# Patient Record
Sex: FEMALE | Race: WHITE | NOT HISPANIC OR LATINO | Employment: FULL TIME | URBAN - METROPOLITAN AREA
[De-identification: names, ages, dates, MRNs, and addresses within clinical notes are randomized per-mention and may not be internally consistent; named-entity substitution may affect disease eponyms.]

---

## 2022-12-20 ENCOUNTER — TELEPHONE (OUTPATIENT)
Dept: RHEUMATOLOGY | Facility: CLINIC | Age: 42
End: 2022-12-20

## 2022-12-20 NOTE — TELEPHONE ENCOUNTER
Spke to pt regarding a sooner appt w/ rheumatology.   Pt stated she can't make appts in January because her family is moving.     Pt agreed to next earliest available Feb 1st at 10:30    Informed pt off apt details, location and provider.

## 2023-02-01 ENCOUNTER — OFFICE VISIT (OUTPATIENT)
Dept: RHEUMATOLOGY | Facility: CLINIC | Age: 43
End: 2023-02-01
Payer: COMMERCIAL

## 2023-02-01 ENCOUNTER — TELEPHONE (OUTPATIENT)
Dept: PHYSICAL MEDICINE AND REHAB | Facility: CLINIC | Age: 43
End: 2023-02-01
Payer: COMMERCIAL

## 2023-02-01 ENCOUNTER — HOSPITAL ENCOUNTER (OUTPATIENT)
Dept: RADIOLOGY | Facility: HOSPITAL | Age: 43
Discharge: HOME | End: 2023-02-01
Payer: COMMERCIAL

## 2023-02-01 VITALS
HEART RATE: 66 BPM | HEIGHT: 63 IN | SYSTOLIC BLOOD PRESSURE: 150 MMHG | DIASTOLIC BLOOD PRESSURE: 102 MMHG | WEIGHT: 167.13 LBS | BODY MASS INDEX: 29.61 KG/M2 | RESPIRATION RATE: 16 BRPM

## 2023-02-01 DIAGNOSIS — M54.41 CHRONIC RIGHT-SIDED LOW BACK PAIN WITH RIGHT-SIDED SCIATICA: ICD-10-CM

## 2023-02-01 DIAGNOSIS — M79.2 RADICULAR PAIN IN RIGHT ARM: ICD-10-CM

## 2023-02-01 DIAGNOSIS — M25.50 POLYARTHRALGIA: Primary | ICD-10-CM

## 2023-02-01 DIAGNOSIS — M25.50 POLYARTHRALGIA: ICD-10-CM

## 2023-02-01 DIAGNOSIS — G89.29 CHRONIC RIGHT-SIDED LOW BACK PAIN WITH RIGHT-SIDED SCIATICA: ICD-10-CM

## 2023-02-01 DIAGNOSIS — M20.11 HALLUX VALGUS OF RIGHT FOOT: ICD-10-CM

## 2023-02-01 PROCEDURE — 72052 XR CERVICAL SPINE 5 VIEW WITH FLEX AND EXT: ICD-10-PCS | Mod: 26,,, | Performed by: RADIOLOGY

## 2023-02-01 PROCEDURE — 3008F PR BODY MASS INDEX (BMI) DOCUMENTED: ICD-10-PCS | Mod: CPTII,S$GLB,,

## 2023-02-01 PROCEDURE — 72052 X-RAY EXAM NECK SPINE 6/>VWS: CPT | Mod: TC

## 2023-02-01 PROCEDURE — 1159F PR MEDICATION LIST DOCUMENTED IN MEDICAL RECORD: ICD-10-PCS | Mod: CPTII,S$GLB,,

## 2023-02-01 PROCEDURE — 99205 PR OFFICE/OUTPT VISIT, NEW, LEVL V, 60-74 MIN: ICD-10-PCS | Mod: S$GLB,,,

## 2023-02-01 PROCEDURE — 3077F PR MOST RECENT SYSTOLIC BLOOD PRESSURE >= 140 MM HG: ICD-10-PCS | Mod: CPTII,S$GLB,,

## 2023-02-01 PROCEDURE — 1160F RVW MEDS BY RX/DR IN RCRD: CPT | Mod: CPTII,S$GLB,,

## 2023-02-01 PROCEDURE — 99999 PR PBB SHADOW E&M-EST. PATIENT-LVL V: ICD-10-PCS | Mod: PBBFAC,,,

## 2023-02-01 PROCEDURE — 3080F PR MOST RECENT DIASTOLIC BLOOD PRESSURE >= 90 MM HG: ICD-10-PCS | Mod: CPTII,S$GLB,,

## 2023-02-01 PROCEDURE — 72110 X-RAY EXAM L-2 SPINE 4/>VWS: CPT | Mod: TC

## 2023-02-01 PROCEDURE — 3008F BODY MASS INDEX DOCD: CPT | Mod: CPTII,S$GLB,,

## 2023-02-01 PROCEDURE — 72110 X-RAY EXAM L-2 SPINE 4/>VWS: CPT | Mod: 26,,, | Performed by: RADIOLOGY

## 2023-02-01 PROCEDURE — 99205 OFFICE O/P NEW HI 60 MIN: CPT | Mod: S$GLB,,,

## 2023-02-01 PROCEDURE — 3080F DIAST BP >= 90 MM HG: CPT | Mod: CPTII,S$GLB,,

## 2023-02-01 PROCEDURE — 3077F SYST BP >= 140 MM HG: CPT | Mod: CPTII,S$GLB,,

## 2023-02-01 PROCEDURE — 1160F PR REVIEW ALL MEDS BY PRESCRIBER/CLIN PHARMACIST DOCUMENTED: ICD-10-PCS | Mod: CPTII,S$GLB,,

## 2023-02-01 PROCEDURE — 72110 XR LUMBAR SPINE COMPLETE 5 VIEW: ICD-10-PCS | Mod: 26,,, | Performed by: RADIOLOGY

## 2023-02-01 PROCEDURE — 1159F MED LIST DOCD IN RCRD: CPT | Mod: CPTII,S$GLB,,

## 2023-02-01 PROCEDURE — 72052 X-RAY EXAM NECK SPINE 6/>VWS: CPT | Mod: 26,,, | Performed by: RADIOLOGY

## 2023-02-01 PROCEDURE — 99999 PR PBB SHADOW E&M-EST. PATIENT-LVL V: CPT | Mod: PBBFAC,,,

## 2023-02-01 RX ORDER — MELOXICAM 15 MG/1
15 TABLET ORAL
COMMUNITY
Start: 2023-01-27 | End: 2023-02-14

## 2023-02-01 RX ORDER — ACYCLOVIR 200 MG/1
200 CAPSULE ORAL
COMMUNITY
Start: 2023-01-04

## 2023-02-01 RX ORDER — VALACYCLOVIR HYDROCHLORIDE 1 G/1
1000 TABLET, FILM COATED ORAL 3 TIMES DAILY
COMMUNITY
Start: 2023-01-14 | End: 2023-02-01 | Stop reason: ALTCHOICE

## 2023-02-01 RX ORDER — NORETHINDRONE ACETATE/ETHINYL ESTRADIOL 1.5-0.03MG
1 KIT ORAL
COMMUNITY
Start: 2023-01-14

## 2023-02-01 RX ORDER — IBUPROFEN 600 MG/1
600 TABLET ORAL EVERY 6 HOURS PRN
COMMUNITY
Start: 2022-11-10

## 2023-02-01 NOTE — PROGRESS NOTES
RHEUMATOLOGY OUTPATIENT CLINIC NOTE    02/01/2023    Subjective:       Patient ID: Linda Larson is a 42 y.o. female.    Chief Complaint: Arthritis      HPI       Linda Larson is a 42 y.o. pleasant female here for rheumatology initial evaluation. Referred for polyarthralgias.     She reports stiffness in her hands first thing in the morning R>L lasting 2 hours. The stiffness has been going on for years.     Reports history of scoliosis in herself and multiple siblings. She did not meet surgical criteria as a child but wore a back brace for 5.5 years starting at age 11. Has previously gone to the [facility] and received lumbar epidural injections.      Reports difficulty getting comfortable while sleeping due to pain. Pain in her right shoulder which radiates down her arm. Also pain in her right shoulder blade. Feels as though the pain in her R arm is so severe at times that the stress of it causes her to have herpes outbreaks. After delievery of one of her children, she had significant body shakes. She was told that this perhaps caused some cervical nerve damage which could contribute to the pain she experiences. She does report occasional weakness in her hands, arms, and legs which varies and occurs at random.     Also with right bunion. Has not seen podiatry. Uses a spacer and tries to wear supportive footwear that is not tight fitting in the toe area. Occasionally with significant pain in the right first MTP. Denies gout flare symptoms.     Reports prior bakers cysts in right knee. Prior knee injections with some relief.       ROS:  Muscular stiffness, am stiffness in hands >2 hours  Some swelling in ankles and feet.Also reports full-finger swelling in multiple fingers 1-2 x/month. Occasional tenderness at various IP joints shiva hands.  Denies synovitis, no erythema, no increased warmth to other joints  Occasionally her fingers will turn purple in the cold.  Denies rash or skin thickening.  Recent shingles infection 3 weeks ago on right forearm.   Denies difficulty swallowing.  Rheumatologic review of systems negative otherwise.       Fam hx: Grandmother with fibromyalgia. Nephew lupus (sister son)   Tobacco use: No. Stopped Feb 2022. Smoked on and off since 17.   Alcohol use: Occasion once monthly  Occupation: Medical billing. Tries to go on walks in morning and afternoon break   Prior was on treadmill working out three days a week. Felt better then.   Clots: No   Miscarriage: one while 3 months along. Partial molar pregnancy    Prior therapies:  Ibuprofen - short term relief. Takes 800 mg at a time. Lasts for 2 hours. Daily. Denies prior PUD  Tylenol arthritis - no improvement.   Turmeric - didn't see significant difference. Took for 2 weeks.       Serologies:  ADOLPH negative 10/19/2022  RF < 14 10/19/2022  Sed rate normal     Physical Exam:  No obvious synovitis, no erythema, no increased warmth to any joints shiva UE/LE.    No dactylitis, no enthesitis.   Good lumbar ROM with forward leaning from standing position.   No skin thickening. No rash. No active raynauds.   No obvious lymphadenopathy.           Past Medical History:   Diagnosis Date    Arthritis     Scoliosis      History reviewed. No pertinent surgical history.  Family History   Problem Relation Age of Onset    Arthritis Mother     Cancer Father     Heart disease Father     Diabetes Mellitus Father     Hypertension Father      Social History     Socioeconomic History    Marital status:    Tobacco Use    Smoking status: Never     Passive exposure: Never    Smokeless tobacco: Never   Substance and Sexual Activity    Alcohol use: Yes    Drug use: Never    Sexual activity: Yes     Partners: Male     Review of patient's allergies indicates:   Allergen Reactions    Latex Other (See Comments)     irritation    Sulfa (sulfonamide antibiotics)     Adhesive tape-silicones Rash           Objective:       BP (!) 150/102 (BP Location: Left arm,  "Patient Position: Sitting, BP Method: Medium (Automatic))   Pulse 66   Resp 16   Ht 5' 2.5" (1.588 m)   Wt 75.8 kg (167 lb 1.7 oz)   BMI 30.08 kg/m²       Immunization History   Administered Date(s) Administered    COVID-19, MRNA, LN-S, PF (Pfizer) (Purple Cap) 10/25/2021, 11/15/2021            Recent Results (from the past 672 hour(s))   CBC Auto Differential    Collection Time: 02/01/23 11:35 AM   Result Value Ref Range    WBC 7.20 3.90 - 12.70 K/uL    RBC 4.57 4.00 - 5.40 M/uL    Hemoglobin 14.3 12.0 - 16.0 g/dL    Hematocrit 42.2 37.0 - 48.5 %    MCV 92 82 - 98 fL    MCH 31.3 (H) 27.0 - 31.0 pg    MCHC 33.9 32.0 - 36.0 g/dL    RDW 13.7 11.5 - 14.5 %    Platelets 284 150 - 450 K/uL    MPV 10.5 9.2 - 12.9 fL    Immature Granulocytes 0.1 0.0 - 0.5 %    Gran # (ANC) 4.6 1.8 - 7.7 K/uL    Immature Grans (Abs) 0.01 0.00 - 0.04 K/uL    Lymph # 2.0 1.0 - 4.8 K/uL    Mono # 0.5 0.3 - 1.0 K/uL    Eos # 0.1 0.0 - 0.5 K/uL    Baso # 0.05 0.00 - 0.20 K/uL    nRBC 0 0 /100 WBC    Gran % 63.6 38.0 - 73.0 %    Lymph % 27.2 18.0 - 48.0 %    Mono % 7.4 4.0 - 15.0 %    Eosinophil % 1.0 0.0 - 8.0 %    Basophil % 0.7 0.0 - 1.9 %    Differential Method Automated    Comprehensive Metabolic Panel    Collection Time: 02/01/23 11:35 AM   Result Value Ref Range    Sodium 137 136 - 145 mmol/L    Potassium 3.8 3.5 - 5.1 mmol/L    Chloride 105 95 - 110 mmol/L    CO2 22 (L) 23 - 29 mmol/L    Glucose 85 70 - 110 mg/dL    BUN 12 6 - 20 mg/dL    Creatinine 0.6 0.5 - 1.4 mg/dL    Calcium 9.6 8.7 - 10.5 mg/dL    Total Protein 7.9 6.0 - 8.4 g/dL    Albumin 4.3 3.5 - 5.2 g/dL    Total Bilirubin 0.5 0.1 - 1.0 mg/dL    Alkaline Phosphatase 66 55 - 135 U/L    AST 21 10 - 40 U/L    ALT 23 10 - 44 U/L    Anion Gap 10 8 - 16 mmol/L    eGFR >60 >60 mL/min/1.73 m^2   Sedimentation rate    Collection Time: 02/01/23 11:35 AM   Result Value Ref Range    Sed Rate 1 0 - 20 mm/Hr   C-Reactive Protein    Collection Time: 02/01/23 11:35 AM   Result Value Ref " Range    CRP 4.0 0.0 - 8.2 mg/L   Uric Acid    Collection Time: 02/01/23 11:35 AM   Result Value Ref Range    Uric Acid 4.1 2.4 - 5.7 mg/dL        No results found for: TBGOLDPLUS   No results found for: HAV, HEPAIGM, HEPBIGM, HEPBCAB, HBEAG, HEPCAB     Assessment:       1. Polyarthralgia    2. Radicular pain in right arm    3. Chronic right-sided low back pain with right-sided sciatica    4. Hallux valgus of right foot          Impression:   Polyarthralgia   Morning stiffness in hands > 2 hours, claw-like stiffness per patient. Reports prior dactylitis. Also possible raynauds in fingers as patient reports prior purple discoloration of fingers with cold exposure. ADOLPH, RF, sed rate, TSH all within normal limits. No synovitis on physical exam. Given prolonged morning stiffness will assess for SSc    Radicular right arm pain  Pain in her right arm which radiates down the arm. Chronic for many years. Good range of motion of that shoulder and full strength bilateral UE.     Chronic low back pain with right sided sciatica  History of scoliosis per patient, wore a back brace for many years as an adolescent.  Chronic low back pain. Prior relief with lumbar DAKOTA.   Sed rate normal. No fam history of ankylosing spondylitis. She does have SI pain. Reported history of dactylitis.  Could assess shiva SI joints to evaluate for AxSpa. However, her morning stiffness is more in the hands and not in the back, no nocturnal awakenings from back pain. No prior uveitis, no IBD.     Plan:          Orders Placed This Encounter   Procedures    X-Ray Cervical Spine 5 View With Flex And Ext    X-Ray Lumbar Spine 5 View    CBC Auto Differential    Comprehensive Metabolic Panel    Sedimentation rate    C-Reactive Protein    Cyclic Citrullinated Peptide Antibody, IgG    Uric Acid    RNA polymerase III Ab, IgG    Anti-Scleroderma Antibody    Anti-Centromere Antibody    Ambulatory referral/consult to Podiatry    EMG W/ ULTRASOUND AND NERVE CONDUCTION  TEST 2 Extremities        Polyarthralgia:  Above labs today   Consider trial turmeric   Will evaluate for autoimmune cause of arthralgia  Raudel hand xray to assess for erosions     R arm pain:  Encourage daily stretches and strengthening  Consider topical voltaren   Consider PT  EMG R arm  Cervical spine xray    Chronic low back pain:  Stretches, exercise  Lumbar xray  Consider referral to back and spine for evaluation, possible injections in future if pain persists.   EMG R leg  Pending labs today could consider SI xray looking for sacroiliitis.     Hallux valgus right 1st mtp:  Continue conservative therapy  Toe stretches  Shoewear with good toe space  Referral podiatry    Labs and xray today   Follow up 3-4 months     Advised patient to let me know if any new or worsening symptoms prior to next appointment.     More Hayes PA-C  [facility]  Rheumatology       60 minutes of total time spent on the encounter, which includes face to face time and non-face to face time preparing to see the patient (eg, review of tests), Obtaining and/or reviewing separately obtained history, Documenting clinical information in the electronic or other health record, Independently interpreting results (not separately reported) and communicating results to the patient/family/caregiver, or Care coordination (not separately reported).       Disclaimer: This note was prepared using voice recognition system and is likely to have sound alike errors and is not proof read.  Please call me with any questions

## 2023-02-02 ENCOUNTER — TELEPHONE (OUTPATIENT)
Dept: RHEUMATOLOGY | Facility: CLINIC | Age: 43
End: 2023-02-02
Payer: COMMERCIAL

## 2023-02-02 NOTE — TELEPHONE ENCOUNTER
----- Message from More Hayes PA-C sent at 2/1/2023  4:00 PM CST -----  Plz have pt do hand and SI xrays earliest convenience

## 2023-02-08 ENCOUNTER — TELEPHONE (OUTPATIENT)
Dept: PHYSICAL MEDICINE AND REHAB | Facility: CLINIC | Age: 43
End: 2023-02-08
Payer: COMMERCIAL

## 2023-02-14 ENCOUNTER — OFFICE VISIT (OUTPATIENT)
Dept: PODIATRY | Facility: CLINIC | Age: 43
End: 2023-02-14
Payer: COMMERCIAL

## 2023-02-14 ENCOUNTER — HOSPITAL ENCOUNTER (OUTPATIENT)
Dept: RADIOLOGY | Facility: HOSPITAL | Age: 43
Discharge: HOME | End: 2023-02-14
Payer: COMMERCIAL

## 2023-02-14 ENCOUNTER — HOSPITAL ENCOUNTER (OUTPATIENT)
Dept: RADIOLOGY | Facility: HOSPITAL | Age: 43
Discharge: HOME | End: 2023-02-14
Attending: PODIATRIST
Payer: COMMERCIAL

## 2023-02-14 VITALS — BODY MASS INDEX: 30.76 KG/M2 | HEIGHT: 62 IN | WEIGHT: 167.13 LBS

## 2023-02-14 DIAGNOSIS — M54.41 CHRONIC RIGHT-SIDED LOW BACK PAIN WITH RIGHT-SIDED SCIATICA: ICD-10-CM

## 2023-02-14 DIAGNOSIS — M25.50 POLYARTHRALGIA: ICD-10-CM

## 2023-02-14 DIAGNOSIS — M79.671 PAIN IN RIGHT FOOT: ICD-10-CM

## 2023-02-14 DIAGNOSIS — E55.9 VITAMIN D INSUFFICIENCY: ICD-10-CM

## 2023-02-14 DIAGNOSIS — G89.29 CHRONIC RIGHT-SIDED LOW BACK PAIN WITH RIGHT-SIDED SCIATICA: ICD-10-CM

## 2023-02-14 DIAGNOSIS — M84.374A STRESS FRACTURE OF METATARSAL BONE, RIGHT, INITIAL ENCOUNTER: Primary | ICD-10-CM

## 2023-02-14 PROCEDURE — 73130 X-RAY EXAM OF HAND: CPT | Mod: 26,LT,, | Performed by: RADIOLOGY

## 2023-02-14 PROCEDURE — 73130 X-RAY EXAM OF HAND: CPT | Mod: TC,50

## 2023-02-14 PROCEDURE — 3008F PR BODY MASS INDEX (BMI) DOCUMENTED: ICD-10-PCS | Mod: CPTII,S$GLB,, | Performed by: PODIATRIST

## 2023-02-14 PROCEDURE — 1159F PR MEDICATION LIST DOCUMENTED IN MEDICAL RECORD: ICD-10-PCS | Mod: CPTII,S$GLB,, | Performed by: PODIATRIST

## 2023-02-14 PROCEDURE — 99204 PR OFFICE/OUTPT VISIT, NEW, LEVL IV, 45-59 MIN: ICD-10-PCS | Mod: S$GLB,,, | Performed by: PODIATRIST

## 2023-02-14 PROCEDURE — 73630 XR FOOT COMPLETE 3 VIEW RIGHT: ICD-10-PCS | Mod: 26,RT,, | Performed by: RADIOLOGY

## 2023-02-14 PROCEDURE — 73630 X-RAY EXAM OF FOOT: CPT | Mod: TC,RT

## 2023-02-14 PROCEDURE — 99999 PR PBB SHADOW E&M-EST. PATIENT-LVL III: CPT | Mod: PBBFAC,,, | Performed by: PODIATRIST

## 2023-02-14 PROCEDURE — 73130 X-RAY EXAM OF HAND: CPT | Mod: 26,RT,, | Performed by: RADIOLOGY

## 2023-02-14 PROCEDURE — 73630 X-RAY EXAM OF FOOT: CPT | Mod: 26,RT,, | Performed by: RADIOLOGY

## 2023-02-14 PROCEDURE — 72202 X-RAY EXAM SI JOINTS 3/> VWS: CPT | Mod: TC

## 2023-02-14 PROCEDURE — 72202: ICD-10-PCS | Mod: 26,,, | Performed by: RADIOLOGY

## 2023-02-14 PROCEDURE — 1159F MED LIST DOCD IN RCRD: CPT | Mod: CPTII,S$GLB,, | Performed by: PODIATRIST

## 2023-02-14 PROCEDURE — 73130 XR SACROILIAC JOINTS COMPLETE: ICD-10-PCS | Mod: 26,LT,, | Performed by: RADIOLOGY

## 2023-02-14 PROCEDURE — 99999 PR PBB SHADOW E&M-EST. PATIENT-LVL III: ICD-10-PCS | Mod: PBBFAC,,, | Performed by: PODIATRIST

## 2023-02-14 PROCEDURE — 99204 OFFICE O/P NEW MOD 45 MIN: CPT | Mod: S$GLB,,, | Performed by: PODIATRIST

## 2023-02-14 PROCEDURE — 1160F PR REVIEW ALL MEDS BY PRESCRIBER/CLIN PHARMACIST DOCUMENTED: ICD-10-PCS | Mod: CPTII,S$GLB,, | Performed by: PODIATRIST

## 2023-02-14 PROCEDURE — 72202 X-RAY EXAM SI JOINTS 3/> VWS: CPT | Mod: 26,,, | Performed by: RADIOLOGY

## 2023-02-14 PROCEDURE — 1160F RVW MEDS BY RX/DR IN RCRD: CPT | Mod: CPTII,S$GLB,, | Performed by: PODIATRIST

## 2023-02-14 PROCEDURE — 3008F BODY MASS INDEX DOCD: CPT | Mod: CPTII,S$GLB,, | Performed by: PODIATRIST

## 2023-02-14 RX ORDER — ERGOCALCIFEROL 1.25 MG/1
50000 CAPSULE ORAL
Qty: 4 CAPSULE | Refills: 3 | Status: SHIPPED | OUTPATIENT
Start: 2023-02-14 | End: 2023-03-08

## 2023-02-14 RX ORDER — DICLOFENAC SODIUM 75 MG/1
75 TABLET, DELAYED RELEASE ORAL 2 TIMES DAILY
Qty: 60 TABLET | Refills: 1 | Status: SHIPPED | OUTPATIENT
Start: 2023-02-14 | End: 2023-03-16

## 2023-02-14 NOTE — PROGRESS NOTES
Subjective:       Patient ID: Linda Larson is a 42 y.o. female.    Chief Complaint: Foot Problem (Coming in for a bunion on right foot, rates pain 10/10, nondiabetic, wearing socks and shoes, No PCP. )      HPI: Linda Larson presents to the clinic today for evaluation concerning stated moderate pains to the right foot/ankle at the midfoot. Patient states pains are approx. 8/10. Pains are described as achy. Pains have been present for duration of weeks. Patient states the pains are exacerbated with walking and standing and prolonged activities. States prior medical evaluation by a MD/DO/DPM/NP. Recent rheumatoid work up was WNL. States NSAIDs. Trauma is not stated. Patient's Primary Care Provider is Primary Doctor No.     Review of patient's allergies indicates:   Allergen Reactions    Latex Other (See Comments)     irritation    Sulfa (sulfonamide antibiotics)     Adhesive tape-silicones Rash       Past Medical History:   Diagnosis Date    Arthritis     Scoliosis        Family History   Problem Relation Age of Onset    Arthritis Mother     Cancer Father     Heart disease Father     Diabetes Mellitus Father     Hypertension Father        Social History     Socioeconomic History    Marital status:    Tobacco Use    Smoking status: Never     Passive exposure: Never    Smokeless tobacco: Never   Substance and Sexual Activity    Alcohol use: Yes    Drug use: Never    Sexual activity: Yes     Partners: Male       History reviewed. No pertinent surgical history.    Review of Systems   Constitutional:  Negative for chills, fatigue and fever.   HENT:  Negative for hearing loss.    Eyes:  Negative for photophobia and visual disturbance.   Respiratory:  Negative for cough, chest tightness, shortness of breath and wheezing.    Cardiovascular:  Negative for chest pain and palpitations.   Gastrointestinal:  Negative for constipation, diarrhea, nausea and vomiting.   Endocrine: Negative for cold intolerance and heat  "intolerance.   Genitourinary:  Negative for flank pain.   Musculoskeletal:  Positive for gait problem. Negative for neck pain and neck stiffness.   Neurological:  Negative for light-headedness and headaches.   Psychiatric/Behavioral:  Negative for sleep disturbance.        Objective:   Ht 5' 2" (1.575 m)   Wt 75.8 kg (167 lb 1.7 oz)   BMI 30.56 kg/m²     X-Ray Foot Complete Right  Narrative: EXAMINATION:  XR FOOT COMPLETE 3 VIEW RIGHT    CLINICAL HISTORY:  . Pain in right foot    TECHNIQUE:  AP, lateral, and oblique views of the foot were performed.    COMPARISON:  None    FINDINGS:  There is no evidence to suggest acute fracture or dislocation.  The joint spaces appear to be well maintained.  There is a well corticated subchondral cyst seen within the medial aspect of the 1st metatarsal head.  Impression: As above    Electronically signed by: Bonilla Stevenson DO  Date:    02/14/2023  Time:    16:09  X-Ray Sacroiliac Joints Complete  Narrative: EXAMINATION:  XR SACROILIAC JOINTS COMPLETE    CLINICAL HISTORY:  Lumbago with sciatica, right side    TECHNIQUE:  Three views    COMPARISON:  None    FINDINGS:  No ankylosis.  No erosive changes or sclerosis identified.  Impression: As above    Electronically signed by: Bonilla Stevenson DO  Date:    02/14/2023  Time:    15:40  X-Ray Hand Complete Bilateral  Narrative: EXAMINATION:  XR HAND COMPLETE 3 VIEWS BILATERAL    CLINICAL HISTORY:  . Pain in unspecified joint    TECHNIQUE:  PA, lateral, and oblique views of both hands were performed.    COMPARISON:  None    FINDINGS:  The joint spaces of both hands appear to be relatively well maintained.  No acute fracture or dislocation.  No erosive changes are identified.  Impression: As above    Electronically signed by: Bonilla Stevenson DO  Date:    02/14/2023  Time:    15:39      Physical Exam  LOWER EXTREMITY PHYSICAL EXAMINATION    VASCULAR: The right DP pulse is 2/4 and the left DP is 24. The right PT pulse is 2/4 and the left PT " pulse is 2/4. Proximal to distal, warm to warm.    DERMATOLOGY: Skin is supple, dry and intact. No erythema is noted.     NEUROLOGY: Negative Tinel's Sign and negative Valleix Sign. No neurological sensations with compression of the area of Simmons's Nerve in the area of the Abductor Hallucis muscle belly. Upon palpation of the interspaces, there are no neurological sensations stated that radiate proximal or distal. Upon compression of the metatarsal heads from medial to lateral, no neurological sensations or symptoms are stated.     ORTHOPEDIC: Pains to palpation, 3rd metatarsal bone, midshaft. Webspace exam is WNL. MTPJ and TMTJ examination is WNL. Stress adduction and abduction is WNL. Gastroc equinus is noted. Minimal edema is noted.     Assessment:     1. Stress fracture of metatarsal bone, right, initial encounter    2. Pain in right foot    3. Vitamin D insufficiency          Plan:     Stress fracture of metatarsal bone, right, initial encounter  -     Ambulatory referral/consult to Podiatry    Pain in right foot  -     X-Ray Foot Complete Right; Future; Expected date: 02/14/2023  -     diclofenac (VOLTAREN) 75 MG EC tablet; Take 1 tablet (75 mg total) by mouth 2 (two) times daily.  Dispense: 60 tablet; Refill: 1    Vitamin D insufficiency  -     ergocalciferol (ERGOCALCIFEROL) 50,000 unit Cap; Take 1 capsule (50,000 Units total) by mouth every 7 days. for 4 doses  Dispense: 4 capsule; Refill: 3    Thorough discussion is had with the patient today, concerning the diagnosis, its etiology, and the treatment algorithm at present.     XRAYS are reviewed in detail with the patient. All questions and concerns regarding findings and its/their implications are outlined and discussed.    Start Vit. D. Qweekly.    Likely Dx of stress reaction.    Would prefer CAM Walker for 14-21 days with PO NSAIDs.     For now, PO NSAIDs only.    F/U if no improvement.           Future Appointments   Date Time Provider Department  [place]   2/24/2023  8:00 AM Jessie Yeung MD [facility] [place]   7/5/2023 12:30 PM [facility]

## 2023-02-24 ENCOUNTER — OFFICE VISIT (OUTPATIENT)
Dept: PHYSICAL MEDICINE AND REHAB | Facility: CLINIC | Age: 43
End: 2023-02-24
Payer: COMMERCIAL

## 2023-02-24 VITALS
RESPIRATION RATE: 13 BRPM | SYSTOLIC BLOOD PRESSURE: 127 MMHG | HEIGHT: 62 IN | DIASTOLIC BLOOD PRESSURE: 89 MMHG | WEIGHT: 167 LBS | HEART RATE: 82 BPM | BODY MASS INDEX: 30.73 KG/M2

## 2023-02-24 DIAGNOSIS — G56.03 BILATERAL CARPAL TUNNEL SYNDROME: ICD-10-CM

## 2023-02-24 DIAGNOSIS — M54.16 LUMBAR RADICULOPATHY: ICD-10-CM

## 2023-02-24 PROBLEM — N92.4 PREMENOPAUSAL MENORRHAGIA: Status: ACTIVE | Noted: 2022-10-31

## 2023-02-24 PROCEDURE — 3074F PR MOST RECENT SYSTOLIC BLOOD PRESSURE < 130 MM HG: ICD-10-PCS | Mod: CPTII,S$GLB,, | Performed by: PHYSICAL MEDICINE & REHABILITATION

## 2023-02-24 PROCEDURE — 99999 PR PBB SHADOW E&M-EST. PATIENT-LVL IV: CPT | Mod: PBBFAC,,, | Performed by: PHYSICAL MEDICINE & REHABILITATION

## 2023-02-24 PROCEDURE — 1160F RVW MEDS BY RX/DR IN RCRD: CPT | Mod: CPTII,S$GLB,, | Performed by: PHYSICAL MEDICINE & REHABILITATION

## 2023-02-24 PROCEDURE — 95886 MUSC TEST DONE W/N TEST COMP: CPT | Mod: S$GLB,,, | Performed by: PHYSICAL MEDICINE & REHABILITATION

## 2023-02-24 PROCEDURE — 1159F PR MEDICATION LIST DOCUMENTED IN MEDICAL RECORD: ICD-10-PCS | Mod: CPTII,S$GLB,, | Performed by: PHYSICAL MEDICINE & REHABILITATION

## 2023-02-24 PROCEDURE — 95913 PR NERVE CONDUCTION STUDY; 13 OR MORE STUDIES: ICD-10-PCS | Mod: S$GLB,,, | Performed by: PHYSICAL MEDICINE & REHABILITATION

## 2023-02-24 PROCEDURE — 3074F SYST BP LT 130 MM HG: CPT | Mod: CPTII,S$GLB,, | Performed by: PHYSICAL MEDICINE & REHABILITATION

## 2023-02-24 PROCEDURE — 1160F PR REVIEW ALL MEDS BY PRESCRIBER/CLIN PHARMACIST DOCUMENTED: ICD-10-PCS | Mod: CPTII,S$GLB,, | Performed by: PHYSICAL MEDICINE & REHABILITATION

## 2023-02-24 PROCEDURE — 3008F BODY MASS INDEX DOCD: CPT | Mod: CPTII,S$GLB,, | Performed by: PHYSICAL MEDICINE & REHABILITATION

## 2023-02-24 PROCEDURE — 99499 NO LOS: ICD-10-PCS | Mod: S$GLB,,, | Performed by: PHYSICAL MEDICINE & REHABILITATION

## 2023-02-24 PROCEDURE — 3079F DIAST BP 80-89 MM HG: CPT | Mod: CPTII,S$GLB,, | Performed by: PHYSICAL MEDICINE & REHABILITATION

## 2023-02-24 PROCEDURE — 95886 PR EMG COMPLETE, W/ NERVE CONDUCTION STUDIES, 5+ MUSCLES: ICD-10-PCS | Mod: S$GLB,,, | Performed by: PHYSICAL MEDICINE & REHABILITATION

## 2023-02-24 PROCEDURE — 99999 PR PBB SHADOW E&M-EST. PATIENT-LVL IV: ICD-10-PCS | Mod: PBBFAC,,, | Performed by: PHYSICAL MEDICINE & REHABILITATION

## 2023-02-24 PROCEDURE — 95913 NRV CNDJ TEST 13/> STUDIES: CPT | Mod: S$GLB,,, | Performed by: PHYSICAL MEDICINE & REHABILITATION

## 2023-02-24 PROCEDURE — 3079F PR MOST RECENT DIASTOLIC BLOOD PRESSURE 80-89 MM HG: ICD-10-PCS | Mod: CPTII,S$GLB,, | Performed by: PHYSICAL MEDICINE & REHABILITATION

## 2023-02-24 PROCEDURE — 1159F MED LIST DOCD IN RCRD: CPT | Mod: CPTII,S$GLB,, | Performed by: PHYSICAL MEDICINE & REHABILITATION

## 2023-02-24 PROCEDURE — 3008F PR BODY MASS INDEX (BMI) DOCUMENTED: ICD-10-PCS | Mod: CPTII,S$GLB,, | Performed by: PHYSICAL MEDICINE & REHABILITATION

## 2023-02-24 PROCEDURE — 99499 UNLISTED E&M SERVICE: CPT | Mod: S$GLB,,, | Performed by: PHYSICAL MEDICINE & REHABILITATION

## 2023-02-24 RX ORDER — MELOXICAM 15 MG/1
15 TABLET ORAL
COMMUNITY
Start: 2022-12-20

## 2023-02-24 NOTE — PROGRESS NOTES
[facility]   [address]  [place] [address]  Phone: [phone]        Full Name: Linda Larson YOB: 1980  Patient ID: [id number]      Visit Date: 2/24/2023 08:20  Age: 42 Years  Examining Physician: Dr Yeung  Referring Physician: TAMARA Jones  Conclusion: upper and lower ext        Chief Complaint   Patient presents with    Numbness     Right arm and leg       HPI: This is a 42 y.o.  female being seen in clinic today for evaluation of chronic right arm and leg numbness/tingling that worsens after increased activity.  She can have achy pain and numbness at rest as well. Medications provide some relief.     History obtained from patient    Past family, medical, social, and surgical history reviewed in chart    Review of Systems:     General- denies lethargy, weight change, fever, chills  Head/neck- denies swallowing difficulties  ENT- denies hearing changes  Cardiovascular-denies chest pain  Pulmonary- denies shortness of breath  GI- denies constipation or bowel incontinence  - denies bladder incontinence  Skin- denies wounds or rashes  Musculoskeletal- denies weakness, + pain  Neurologic- + numbness and tingling  Psychiatric- denies depressive or psychotic features, denies anxiety  Lymphatic-denies swelling  Endocrine- denies hypoglycemic symptoms/DM history  All other pertinent systems negative     Physical Examination:  General: Well developed, well nourished female, NAD  HEENT:NCAT EOMI bilaterally   Pulmonary:Normal respirations    Spinal Examination: CERVICAL  Active ROM is within normal limits.  Inspection: No deformity of spinal alignment.     Spinal Examination: LUMBAR or THORACIC  Active ROM is within normal limits.  Inspection: No deformity of spinal alignment.    Slr neg bilaterally    Musculoskeletal Tests:  Phalen: neg  Elbow compression (ulnar): +on right  Tinels at wrist: +on right    Bilateral Upper and Lower Extremities:  Pulses are 2+ at radial  bilaterally.  Shoulder/Elbow/Wrist/Hand ROM wnl  Hip/Knee/Ankle ROM wnl  Bilateral Extremities show normal capillary refill.  No signs of cyanosis, rubor, edema, skin changes, or dysvascular changes of appendages.  Nails appear intact.    Neurological Exam:  Cranial Nerves:  II-XII grossly intact    Manual Muscle Testing: (Motor 5=normal)  5/5 strength bilateral upper/lower extremities    No focal atrophy is noted of either upper or lower extremity.    Bilateral Reflexes:  Pereira's response is absent bilaterally.  No clonus at knee or ankle.    Sensation: tested to light touch  - intact in all four limbs except dec at right 1st,2nd hand digits    Gait: Narrow base and good arm swing.      Entire procedure explained to patient prior to proceeding.  Verbal consent obtained        Sensory NCS      Nerve / Sites Rec. Site Onset Lat Peak Lat NP Amp PP Amp Segments Distance Velocity     ms ms µV µV  mm m/s   R Median - Digit II (Antidromic)      Wrist Dig II 2.88 3.81 24.6 25.6 Wrist - Dig  49   L Median - Digit II (Antidromic)      Wrist Dig II 2.90 3.75 48.8 54.7 Wrist - Dig  48   R Ulnar - Digit V (Antidromic)      Wrist Dig V 2.50 3.44 17.7 35.7 Wrist - Dig V 140 56   L Ulnar - Digit V (Antidromic)      Wrist Dig V 2.85 3.65 11.3 22.1 Wrist - Dig V 140 49   R Radial - Anatomical snuff box (Forearm)      Forearm Wrist 1.54 2.50 12.8 17.0 Forearm - Wrist 100 65   L Radial - Anatomical snuff box (Forearm)      Forearm Wrist 2.08 2.50 20.8 18.9 Forearm - Wrist 100 48   R Sural - Ankle (Calf)      Calf Ankle 2.58 3.35 7.6 13.8 Calf - Ankle 140 54   R Superficial peroneal - Ankle      Lat leg Ankle 2.67 3.42 7.4 5.1 Lat leg - Ankle 140 53                       Combined Sensory Index      Nerve / Sites Rec. Site Peak Lat NP Amp PP Amp Segments Peak Diff     ms µV µV  ms   R Median - CSI      Median Thumb 2.85 6.9 18.3 Median - Radial 0.46      Radial Thumb 2.40 13.5 7.2 Median - Ulnar 0.27      Median Ring 3.96  9.2 13.1 Median palm - Ulnar palm 0.73      Ulnar Ring 3.69 7.2 10.8        Median palm Wrist 2.35 6.5 11.9        Ulnar palm Wrist 1.63 27.0 23.0        CSI     CSI 1.46   L Median - CSI      Median Thumb 2.90 27.9 47.8 Median - Radial 0.40      Radial Thumb 2.50 31.4 10.2 Median - Ulnar 0.44      Median Ring 4.19 11.1 19.8 Median palm - Ulnar palm 0.31      Ulnar Ring 3.75 2.6 11.8        Median palm Wrist 2.06 90.2 92.7        Ulnar palm Wrist 1.75 41.8 36.1        CSI     CSI 1.15           Motor NCS      Nerve / Sites Muscle Latency Amplitude Amp % Duration Segments Distance Lat Diff Velocity     ms mV % ms  mm ms m/s   R Median - APB      Wrist APB 3.17 8.6 100 8.33 Wrist - APB 80        Elbow APB 7.08 5.0 57.7 7.63 Elbow - Wrist 200 3.92 51   L Median - APB      Wrist APB 3.15 9.8 100 7.75 Wrist - APB 80        Elbow APB 6.90 8.1 82.6 7.79 Elbow - Wrist 200 3.75 53   R Ulnar - ADM      Wrist ADM 2.85 11.4 100 7.13 Wrist - ADM 80        B.Elbow ADM 6.19 9.3 81.3 6.56 B.Elbow - Wrist 210 3.33 63      A.Elbow ADM 8.04 9.8 85.7 6.73 A.Elbow - B.Elbow 110 1.85 59   L Ulnar - ADM      Wrist ADM 2.69 8.9 100 7.73 Wrist - ADM 80        B.Elbow ADM 5.65 9.7 109 7.46 B.Elbow - Wrist 200 2.96 68      A.Elbow ADM 7.29 9.3 104 7.79 A.Elbow - B.Elbow 100 1.65 61   R Peroneal - EDB      Ankle EDB 3.73 6.3 100 5.98 Ankle - EDB 80        Fib head EDB 8.46 6.4 101 6.71 Fib head - Ankle 275 4.73 58      Pop fossa EDB 9.69 6.3 100 6.38 Pop fossa - Fib head 70 1.23 57   R Tibial - AH      Ankle AH 3.42 9.8 100 4.75 Ankle - AH 80        Pop fossa AH 11.42 5.5 56.2 5.56 Pop fossa - Ankle 400 8.00 50                   EMG Summary Table     Spontaneous MUAP Recruitment   Muscle Nerve Roots IA Fib PSW Fasc H.F. Amp Dur. PPP Pattern   R. Deltoid Axillary C5-C6 N None None None None N N N N   R. Pronator teres Median C6-C7 N None None None None N N N N   R. First dorsal interosseous Ulnar C8-T1 N None None None None N N N N   R. Rectus  femoris Femoral L2-L4 N None None None None N N N N   R. Biceps femoris (short head) Sciatic (peroneal division) L5-S2 N None None None None N N N N   R. Tibialis anterior Deep peroneal (Fibular) L4-L5 N None None None None N N N N   R. Gastrocnemius (Medial head) Tibial S1-S2 N None None None None N N N N   R. Extensor digitorum brevis Tibial L5-S1 1+ None 1+ None None N N 1+ 1+   R. Abductor hallucis Tibial S1-S2 1+ None None None None N 1+ N 1+       INTERPRETATION  -Bilateral median motor nerve conduction study showed normal latency, amplitude, and conduction velocity  -Bilateral median sensory nerve conduction study showed normal peak latency and amplitude  -Bilateral ulnar motor nerve conduction study showed normal latency, amplitude, and conduction velocity  -Bilateral ulnar sensory nerve conduction study showed normal peak latency and amplitude  -Bilateral radial sensory nerve conduction study showed normal peak latency and amplitude  -Bilateral combined sensory index was significant  -Right superficial peroneal sensory nerve conduction study showed normal peak latency and amplitude  -Right sural sensory nerve conduction study showed normal peak latency and amplitude  -Right peroneal motor nerve conduction study showed normal latency, amplitude, and conduction velocity  -Right tibial motor nerve conduction study showed normal latency, amplitude, and conduction velocity  -Needle EMG examination performed to above mentioned muscles       IMPRESSION  ABNORMAL study  2. 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  There was no evidence of a cervical radiculopathy of the C5-T1 nerve roots of the right upper extremity. There is evidence of an acute on chronic radiculopathy of the right L5 nerve root and a subacute on chronic radiculopathy of the right S1 nerve root    PLAN  Discussed in detail for greater  than 30 minutes about diagnosis and treatment plan    1. Follow up with referring provider: More Hayes  2. Handouts on CTS and lumbar radic and exercises provided. Rec wrist braces and possible referral to ortho for CTS.  Rec PT and referral to IPM for lumbar radic  3. This study is good for one year. If symptoms worsen or do not improve, please re-consult.    Jessie Yeung M.D.  Physical Medicine and Rehab